# Patient Record
Sex: MALE | Race: BLACK OR AFRICAN AMERICAN | NOT HISPANIC OR LATINO | Employment: OTHER | URBAN - METROPOLITAN AREA
[De-identification: names, ages, dates, MRNs, and addresses within clinical notes are randomized per-mention and may not be internally consistent; named-entity substitution may affect disease eponyms.]

---

## 2017-04-17 ENCOUNTER — HISTORICAL (OUTPATIENT)
Dept: ADMINISTRATIVE | Facility: HOSPITAL | Age: 67
End: 2017-04-17

## 2020-12-03 DIAGNOSIS — E11.40 DIABETIC NEUROPATHY: Primary | ICD-10-CM

## 2020-12-08 ENCOUNTER — CLINICAL SUPPORT (OUTPATIENT)
Dept: REHABILITATION | Facility: HOSPITAL | Age: 70
End: 2020-12-08
Payer: OTHER GOVERNMENT

## 2020-12-08 DIAGNOSIS — E11.42 DIABETIC POLYNEUROPATHY ASSOCIATED WITH TYPE 2 DIABETES MELLITUS: ICD-10-CM

## 2020-12-08 DIAGNOSIS — E11.40 DIABETIC NEUROPATHY: ICD-10-CM

## 2020-12-08 PROCEDURE — 97162 PT EVAL MOD COMPLEX 30 MIN: CPT

## 2020-12-08 NOTE — PLAN OF CARE
"Name: Navi Burrows   Clinic Number: [id number]     Navi is a 70 y.o. male evaluated on 12/08/2020    Diagnosis:    Encounter Diagnosis   Name Primary?    Diabetic neuropathy       Physician:  Tiago Vazquez*     No past medical history on file.     No current outpatient medications on file.     No current facility-administered medications for this visit.         Review of patient's allergies indicates:  Not on File    Precautions :Fall  Occupation:   Retired    SocHx:   Patient lives with their family  in a single family home Navi has  has ramps to enter home.     Pt has DME  as follows:SW, 4-wheeled rollator, transfer tub bench, wheelchair and bedside commode  Previous Level of Function:  Ambulating with rollator in home. Reported gradual decrease in function, LE strength.    SUBJECTIVE    Navi Burrows's symptoms are gradual in onset.      Since onset:  worsening     Description of symptoms : decreased endurance, generalized weakness.    Falls: Yes. Last fall over 1 year ago.    Patient Goals:  To be able to ambulate easier and with better endurance. To be able to get in the shower without assistance.    OBJECTIVE:    Mental status :alert    Posture Alignment :forward head    Sensation: Light Touch: Impaired: (B) LE's with hypersensitivity (B) feet                   RANGE OF MOTION--LOWER EXTREMITIES   (R) LE: WFLs PROM   (L) LE: WFLs PROM        Lower Extremity Strength  Right LE  Left LE    Knee extension: 3-/5 Knee extension: 3+/5   Knee flexion: 3+/5 Knee flexion: 3+/5   Hip flexion: 3-/5 Hip flexion: 3-/5   Ankle dorsiflexion:   3-/5 Ankle dorsiflexion:   3/5   Ankle plantarflexion: 2-/5 Ankle plantarflexion: 3-/5     GAIT: Navi ambulates 50 feet with rollator with Mod I. Reported marked fatigue walking from parking lot to PT Dept, ~ 175 feet, and that he "wasn't sure if I would make it."    GAIT DEVIATIONS: Navi displays occasional unsteady gait with decreased base of support, " difficulty with advancement of (B)LE's, greatest limitation on the (R). Difficulty clearing toes with minimal swing phase. Bilateral knee flexion during stance.    LOWER EXTREMITY FUNCTIONAL SCALE         1. Any of your usual work, housework or school activities   0/4  2. Your usual hobbies, sporting     0/4  3. Getting in and out of tub      0/4  4. Walking between rooms      2/4  5. Putting on shoes or socks      2/4  6. Squatting        0/4  7. Lifting an object from the ground      1/4  8. Performing light activities around the home   1/4  9. Performing heavy activities around the home   0/4  10. Getting in and out of car      2/4  11. Walking 2 blocks       1/4  12.Walking a mile       0/4  13. Getting up and down 1 flight of stairs    0/4  14. Standing for 1 hour      0/4  15. Sitting for an hour       1/4  16. Running on even ground      0/4  17. Running on uneven ground     0/4  18. Making sharp turns when running fast    0/4  19. Hopping        0/4  20. Rolling over in bed       0/4  Score 10/80    Did not perform GALLAGHER Assessment at this time.       Time in: 1415  Time Out: 1455    PT Evaluation Completed?: Yes  Discussed plan of care with patient?: Yes    Written Home Exercises Provided: not today  Pt demonstrated good understanding of the education provided.     Assessment     This is a 70 y.o. male with a medical diagnosis of   Encounter Diagnosis   Name Primary?    Diabetic neuropathy    . Patient presents with abnormality of gait, decreased ambulatory/functional endurance, weakness (B)LE's, decreased functional mobility. Navi will benefit from skilled physical therapy intervention to address the above impairments and functional limitations.     The following goals were discussed with the patient and patient is in agreement with them as to be addressed in the treatment plan.     STG'S: 3 weeks  1. Pt will be able to ambulate 100 feet with rollator with minimal fatigue.  2. Pt will demonstrate  "ability to step up a 2" step with rollator and SBA  3. Pt will be independent with a home exercise program.      LTG'S : 6 weeks  1. Pt will report ability to get into the shower with only moderate assistance  2. Pt will report able to ambulate 150+ feet with rollator with only minimal fatigue  3. Pt will demonstrate ability to go sit to stand from a chair easily.  4. Pt will report able to walk between the rooms in his home with only a little difficulty.    PLAN:    Navi will be seen 2 times per weeks for the next 7 weeks fortherapeutice exercises, therapeutic activities, gait training, home exercise progression , patient education, balance and coordination exercises, step management and transfer training. Pt may be seen by a PTA as part of the Rehab Team. Certification 12/8/20 thru 1/27/21.    I certify the need for these services furnished under this plan of treatment and while under my care.       ____________________________________  Physician/Referring Practitioner    _______________  Date of Signature  "

## 2020-12-15 ENCOUNTER — CLINICAL SUPPORT (OUTPATIENT)
Dept: REHABILITATION | Facility: HOSPITAL | Age: 70
End: 2020-12-15
Payer: OTHER GOVERNMENT

## 2020-12-15 DIAGNOSIS — M62.81 GENERALIZED MUSCLE WEAKNESS: Primary | ICD-10-CM

## 2020-12-15 PROCEDURE — 97110 THERAPEUTIC EXERCISES: CPT

## 2020-12-15 NOTE — PROGRESS NOTES
"                                                    Physical Therapy Daily Note     Name: Navi Burrows  Clinic Number: [id number]  Diagnosis: No diagnosis found. Generalized weakness, deconditioning  Physician: Tiago Vazquez*  Precautions: Fall  Visit #: 2 of 15  Time In: 1055  Time Out: 1150    Subjective     Pt reports: Doing OK. Stated to "foot doctor" yesterday with some (B) foot soreness.  Pain Scale: Navi stated some (B) LE discomfort but would not rate pain level.    Objective     Navi received individual therapeutic exercises to develop strength, endurance and core stabilization for 55 minutes including:  Lower trunk rotation 2 x 10, quad sets 3 x 10 x 6 seconds, supine TA 10 x 6 seconds, LAQ 3x10. Seated hip flexion x10 with TA activation, seated heel and toe raises 3x10. Omnicycle x 4 minutes, neuro, 2 adrian    Written Home Exercises Provided: next visit  Pt demo good understanding of the education provided.    Education provided re: performing home exercises, activities.  Navi verbalized good understanding of education provided.   No spiritual or educational barriers to learning provided    Assessment     Patient tolerated exercises with minimal dificulty. Some fatigue noted with activities but pt appears to want to push through and difficulties with mobility, strength. Highly motivated.  This is a 70 y.o. male referred to outpatient physical therapy and presents with a medical diagnosis of generalized weakness and demonstrates limitations as described in the problem list. Pt prognosis is Good. Pt will continue to benefit from skilled outpatient physical therapy to address the deficits listed in the problem list, provide pt/family education and to maximize pt's level of independence in the home and community environment.     Goals as follows:  STG'S: 3 weeks  1. Pt will be able to ambulate 100 feet with rollator with minimal fatigue.  2. Pt will demonstrate ability to step up a 2" step " with rollator and SBA  3. Pt will be independent with a home exercise program.        LTG'S  : 6 weeks  1. Pt will report ability to get into the shower with only moderate assistance  2. Pt will report able to ambulate 150+ feet with rollator with only minimal fatigue  3. Pt will demonstrate ability to go sit to stand from a chair easily.  4. Pt will report able to walk between the rooms in his home with only a little difficulty.      Plan     Continue with established Plan of Care towards PT goals.    Therapist: Garrison Tejada, PT  12/15/2020

## 2020-12-16 ENCOUNTER — TELEPHONE (OUTPATIENT)
Dept: REHABILITATION | Facility: HOSPITAL | Age: 70
End: 2020-12-16

## 2020-12-17 ENCOUNTER — CLINICAL SUPPORT (OUTPATIENT)
Dept: REHABILITATION | Facility: HOSPITAL | Age: 70
End: 2020-12-17
Payer: OTHER GOVERNMENT

## 2020-12-17 DIAGNOSIS — M62.81 GENERALIZED MUSCLE WEAKNESS: Primary | ICD-10-CM

## 2020-12-17 PROCEDURE — 97110 THERAPEUTIC EXERCISES: CPT

## 2020-12-17 NOTE — PROGRESS NOTES
"                                                    Physical Therapy Daily Note     Name: Navi Burrows  Clinic Number: [id number]  Diagnosis: No diagnosis found. Generalized weakness, deconditioning  Physician: Tiago Vazquez*  Precautions: Fall  Visit #: 3 of 15  Time In: 1101  Time Out: 1156    Subjective     Pt reports: Doing OK. "I always have pain. Whatever we got to do, let's do it."  Pain Scale: Navi stated 6/10 pain (B) feet, legs, "usual".    Objective     Navi received individual therapeutic exercises to develop strength, endurance and core stabilization for 55 minutes including:  Lower trunk rotation 2 x 10, quad sets 3 x 10 x 6 seconds, supine TA 2x10 x 6 seconds, TKE 3x10 1.5#, bridging x 10, LAQ 2x10. Seated heel and toe raises 3x10. Omnicycle x 6 minutes, neuro, 2 adrian. Ambulated with rollator, SBA to parking lot ~ 175 feet with very slow pattern. C/o fatigue (B) LE's and shoulders.    Written Home Exercises Provided: next visit  Pt demo good understanding of the education provided.    Education provided re: performing home exercises, activities.  Navi verbalized good understanding of education provided.   No spiritual or educational barriers to learning provided    Assessment     Patient tolerated exercises with minimal dificulty. Able to perform TKE with weights. Fatigue noted (B) quadriceps noted today. Did not perform seated hip flexion. Increased activity on Omnicycle. Highly motivated.  This is a 70 y.o. male referred to outpatient physical therapy and presents with a medical diagnosis of generalized weakness and demonstrates limitations as described in the problem list. Pt prognosis is Good. Pt will continue to benefit from skilled outpatient physical therapy to address the deficits listed in the problem list, provide pt/family education and to maximize pt's level of independence in the home and community environment.     Goals as follows:  STG'S: 3 weeks  1. Pt will be able " "to ambulate 100 feet with rollator with minimal fatigue.  2. Pt will demonstrate ability to step up a 2" step with rollator and SBA  3. Pt will be independent with a home exercise program.        LTG'S  : 6 weeks  1. Pt will report ability to get into the shower with only moderate assistance  2. Pt will report able to ambulate 150+ feet with rollator with only minimal fatigue  3. Pt will demonstrate ability to go sit to stand from a chair easily.  4. Pt will report able to walk between the rooms in his home with only a little difficulty.      Plan     Continue with established Plan of Care towards PT goals.    Therapist: Garrison Tejada, PT  12/17/2020      "

## 2020-12-21 ENCOUNTER — TELEPHONE (OUTPATIENT)
Dept: REHABILITATION | Facility: HOSPITAL | Age: 70
End: 2020-12-21

## 2020-12-22 ENCOUNTER — CLINICAL SUPPORT (OUTPATIENT)
Dept: REHABILITATION | Facility: HOSPITAL | Age: 70
End: 2020-12-22
Payer: OTHER GOVERNMENT

## 2020-12-22 DIAGNOSIS — M62.81 GENERALIZED MUSCLE WEAKNESS: Primary | ICD-10-CM

## 2020-12-22 PROCEDURE — 97110 THERAPEUTIC EXERCISES: CPT | Mod: CQ

## 2020-12-22 NOTE — PROGRESS NOTES
"                                                    Physical Therapy Daily Note     Name: Navi Burrows  Clinic Number: [id number]  Diagnosis: Generalized weakness, deconditioning  Physician: Tiago Vazquez*  Precautions: Fall  Visit #: 4 of 15  Time In: 1048  Time Out: 1150    Subjective     Pt reports: "I live in pain." Pt states "I was weak this morning."  Pain Scale: Navi stated 7/10 pain (B) feet, legs, "usual".    Objective     Navi received individual therapeutic exercises to develop strength, endurance and core stabilization for 55 minutes including:  Lower trunk rotation 2 x 10, quad sets 3 x 10 x 6 seconds, supine TA 2x10 x 6 seconds, TKE 3x10 1.5#, bridging x 10, LAQ 2x10. Seated heel and toe raises 3x10. Seated alt hip flexion 3x10. Omnicycle x 2 minutes, neuro, 2 adrian. Omnicyle pedal not secured. Attempted to repair, but unsuccessful. Therefore stopped activity at this time. Offered to assist pt to car with W/C to follow d/t weakness, however, pt declined. Family assisted pt back to parking lot w/ use of rollator.     Written Home Exercises Provided: next visit.  Pt demo good understanding of the education provided.    Education provided re: performing home exercises, activities.  Navi verbalized good understanding of education provided.   No spiritual or educational barriers to learning provided    Assessment     Pt required assistance for mat mobility sup<>sit. Along w/ assistance to maintain heel contact to mat while in supine hooklying position. Post session, pt reported "You need to get tougher." Reminded pt that ex's were progressed during today's session despite subjective comments of increased weakness and fatigue at the start of therapy. Will cont to adjust and monitor appropriately  w/ each session.     This is a 70 y.o. male referred to outpatient physical therapy and presents with a medical diagnosis of generalized weakness and demonstrates limitations as described in the " "problem list. Pt will continue to benefit from skilled outpatient physical therapy to address the deficits listed in the problem list, provide pt/family education and maximize pt's level of independence in the home and community environment.     Goals as follows:  STG'S: 3 weeks  1. Pt will be able to ambulate 100 feet with rollator with minimal fatigue.  2. Pt will demonstrate ability to step up a 2" step with rollator and SBA  3. Pt will be independent with a home exercise program.        LTG'S  : 6 weeks  1. Pt will report ability to get into the shower with only moderate assistance  2. Pt will report able to ambulate 150+ feet with rollator with only minimal fatigue  3. Pt will demonstrate ability to go sit to stand from a chair easily.  4. Pt will report able to walk between the rooms in his home with only a little difficulty.      Plan     Continue with established Plan of Care towards PT goals.    Therapist: Chacha Bustos, PTA  12/22/2020      "

## 2020-12-28 ENCOUNTER — TELEPHONE (OUTPATIENT)
Dept: REHABILITATION | Facility: HOSPITAL | Age: 70
End: 2020-12-28

## 2020-12-29 ENCOUNTER — CLINICAL SUPPORT (OUTPATIENT)
Dept: REHABILITATION | Facility: HOSPITAL | Age: 70
End: 2020-12-29
Payer: OTHER GOVERNMENT

## 2020-12-29 DIAGNOSIS — M62.81 GENERALIZED MUSCLE WEAKNESS: Primary | ICD-10-CM

## 2020-12-29 PROCEDURE — 97110 THERAPEUTIC EXERCISES: CPT | Mod: CQ

## 2020-12-29 NOTE — PROGRESS NOTES
"                                                    Physical Therapy Daily Note     Name: Navi Burrows  Clinic Number: [id number]  Diagnosis: Generalized weakness, deconditioning  Physician: Tiago Vazquez*  Precautions: Fall  Visit #: 4 of 15  Time In: 1100  Time Out: 1200    Subjective     Pt reports: knee pain  Pain Scale: Navi stated 6/10 pain (L) > (R) knee, legs, "usual".    Objective     Navi received individual therapeutic exercises to develop strength, endurance and core stabilization for minutes including:  Lower trunk rotation 2 x 10, quad sets 3 x 10 x 6 seconds, supine TA 2x10 x 6 seconds, bridging 2x10, LAQ 2x10 2#. Seated heel and toe raises 3x10. Seated alt hip flexion 3x10 & seated ball squeeze 20x5". Omnicycle x 15 minutes, neuro, 2 adrian.     Written Home Exercises Provided: developed and distributed HEP handout.   Pt demo good understanding of the education provided.    Education provided re: performing home exercises, activities.  Navi verbalized good understanding of education provided.   No spiritual or educational barriers to learning provided    Assessment     Slight ex progression w/ good tolerance and no increase in pain reports.     This is a 70 y.o. male referred to outpatient physical therapy and presents with a medical diagnosis of generalized weakness and demonstrates limitations as described in the problem list. Pt will continue to benefit from skilled outpatient physical therapy to address the deficits listed in the problem list, provide pt/family education and maximize pt's level of independence in the home and community environment.     Goals as follows:  STG'S: 3 weeks  1. Pt will be able to ambulate 100 feet with rollator with minimal fatigue.  2. Pt will demonstrate ability to step up a 2" step with rollator and SBA  3. Pt will be independent with a home exercise program.        LTG'S  : 6 weeks  1. Pt will report ability to get into the shower with only " moderate assistance  2. Pt will report able to ambulate 150+ feet with rollator with only minimal fatigue  3. Pt will demonstrate ability to go sit to stand from a chair easily.  4. Pt will report able to walk between the rooms in his home with only a little difficulty.      Plan     Continue with established Plan of Care towards PT goals.    Therapist: Chacha Bustos, PTA  12/29/2020

## 2021-01-05 ENCOUNTER — CLINICAL SUPPORT (OUTPATIENT)
Dept: REHABILITATION | Facility: HOSPITAL | Age: 71
End: 2021-01-05
Payer: OTHER GOVERNMENT

## 2021-01-05 DIAGNOSIS — M62.81 GENERALIZED MUSCLE WEAKNESS: Primary | ICD-10-CM

## 2021-01-05 PROCEDURE — 97110 THERAPEUTIC EXERCISES: CPT

## 2021-01-06 ENCOUNTER — TELEPHONE (OUTPATIENT)
Dept: REHABILITATION | Facility: HOSPITAL | Age: 71
End: 2021-01-06

## 2021-01-07 ENCOUNTER — CLINICAL SUPPORT (OUTPATIENT)
Dept: REHABILITATION | Facility: HOSPITAL | Age: 71
End: 2021-01-07
Payer: OTHER GOVERNMENT

## 2021-01-07 DIAGNOSIS — M62.81 GENERALIZED MUSCLE WEAKNESS: Primary | ICD-10-CM

## 2021-01-07 PROCEDURE — 97110 THERAPEUTIC EXERCISES: CPT | Mod: CQ

## 2021-01-12 ENCOUNTER — CLINICAL SUPPORT (OUTPATIENT)
Dept: REHABILITATION | Facility: HOSPITAL | Age: 71
End: 2021-01-12
Payer: OTHER GOVERNMENT

## 2021-01-12 DIAGNOSIS — M62.81 GENERALIZED MUSCLE WEAKNESS: Primary | ICD-10-CM

## 2021-01-12 PROCEDURE — 97110 THERAPEUTIC EXERCISES: CPT | Mod: CQ

## 2021-01-14 ENCOUNTER — CLINICAL SUPPORT (OUTPATIENT)
Dept: REHABILITATION | Facility: HOSPITAL | Age: 71
End: 2021-01-14
Payer: OTHER GOVERNMENT

## 2021-01-14 ENCOUNTER — TELEPHONE (OUTPATIENT)
Dept: REHABILITATION | Facility: HOSPITAL | Age: 71
End: 2021-01-14

## 2021-01-14 DIAGNOSIS — M62.81 GENERALIZED MUSCLE WEAKNESS: Primary | ICD-10-CM

## 2021-01-14 PROCEDURE — 97110 THERAPEUTIC EXERCISES: CPT | Mod: CQ

## 2021-01-19 ENCOUNTER — CLINICAL SUPPORT (OUTPATIENT)
Dept: REHABILITATION | Facility: HOSPITAL | Age: 71
End: 2021-01-19
Payer: OTHER GOVERNMENT

## 2021-01-19 DIAGNOSIS — M62.81 GENERALIZED MUSCLE WEAKNESS: Primary | ICD-10-CM

## 2021-01-19 PROCEDURE — 97110 THERAPEUTIC EXERCISES: CPT | Mod: CQ

## 2021-01-20 ENCOUNTER — TELEPHONE (OUTPATIENT)
Dept: REHABILITATION | Facility: HOSPITAL | Age: 71
End: 2021-01-20

## 2021-01-21 ENCOUNTER — CLINICAL SUPPORT (OUTPATIENT)
Dept: REHABILITATION | Facility: HOSPITAL | Age: 71
End: 2021-01-21
Payer: OTHER GOVERNMENT

## 2021-01-21 DIAGNOSIS — M62.81 GENERALIZED MUSCLE WEAKNESS: Primary | ICD-10-CM

## 2021-01-21 PROCEDURE — 97110 THERAPEUTIC EXERCISES: CPT | Mod: CQ

## 2021-01-27 ENCOUNTER — DOCUMENTATION ONLY (OUTPATIENT)
Dept: REHABILITATION | Facility: HOSPITAL | Age: 71
End: 2021-01-27

## 2021-01-28 ENCOUNTER — CLINICAL SUPPORT (OUTPATIENT)
Dept: REHABILITATION | Facility: HOSPITAL | Age: 71
End: 2021-01-28
Payer: OTHER GOVERNMENT

## 2021-01-28 DIAGNOSIS — M62.81 GENERALIZED MUSCLE WEAKNESS: Primary | ICD-10-CM

## 2021-01-28 PROCEDURE — 97110 THERAPEUTIC EXERCISES: CPT

## 2021-02-02 ENCOUNTER — CLINICAL SUPPORT (OUTPATIENT)
Dept: REHABILITATION | Facility: HOSPITAL | Age: 71
End: 2021-02-02
Payer: OTHER GOVERNMENT

## 2021-02-02 DIAGNOSIS — M62.81 GENERALIZED MUSCLE WEAKNESS: Primary | ICD-10-CM

## 2021-02-02 PROCEDURE — 97110 THERAPEUTIC EXERCISES: CPT | Mod: CQ

## 2021-02-04 ENCOUNTER — HISTORICAL (OUTPATIENT)
Dept: CARDIOLOGY | Facility: HOSPITAL | Age: 71
End: 2021-02-04

## 2021-02-08 ENCOUNTER — TELEPHONE (OUTPATIENT)
Dept: REHABILITATION | Facility: HOSPITAL | Age: 71
End: 2021-02-08

## 2021-02-09 ENCOUNTER — CLINICAL SUPPORT (OUTPATIENT)
Dept: REHABILITATION | Facility: HOSPITAL | Age: 71
End: 2021-02-09
Payer: OTHER GOVERNMENT

## 2021-02-09 DIAGNOSIS — M62.81 GENERALIZED MUSCLE WEAKNESS: Primary | ICD-10-CM

## 2021-02-09 PROCEDURE — 97110 THERAPEUTIC EXERCISES: CPT

## 2021-02-11 ENCOUNTER — CLINICAL SUPPORT (OUTPATIENT)
Dept: REHABILITATION | Facility: HOSPITAL | Age: 71
End: 2021-02-11
Payer: OTHER GOVERNMENT

## 2021-02-11 DIAGNOSIS — M62.81 GENERALIZED MUSCLE WEAKNESS: Primary | ICD-10-CM

## 2021-02-11 PROCEDURE — 97110 THERAPEUTIC EXERCISES: CPT | Mod: CQ

## 2021-02-18 ENCOUNTER — CLINICAL SUPPORT (OUTPATIENT)
Dept: REHABILITATION | Facility: HOSPITAL | Age: 71
End: 2021-02-18
Payer: OTHER GOVERNMENT

## 2021-02-18 DIAGNOSIS — M62.81 GENERALIZED MUSCLE WEAKNESS: Primary | ICD-10-CM

## 2021-02-18 PROCEDURE — 97110 THERAPEUTIC EXERCISES: CPT | Mod: CQ

## 2021-02-23 ENCOUNTER — CLINICAL SUPPORT (OUTPATIENT)
Dept: REHABILITATION | Facility: HOSPITAL | Age: 71
End: 2021-02-23
Payer: OTHER GOVERNMENT

## 2021-02-23 DIAGNOSIS — M62.81 GENERALIZED MUSCLE WEAKNESS: Primary | ICD-10-CM

## 2021-02-23 PROCEDURE — 97110 THERAPEUTIC EXERCISES: CPT | Mod: CQ

## 2021-02-25 ENCOUNTER — CLINICAL SUPPORT (OUTPATIENT)
Dept: REHABILITATION | Facility: HOSPITAL | Age: 71
End: 2021-02-25
Payer: OTHER GOVERNMENT

## 2021-02-25 DIAGNOSIS — M62.81 GENERALIZED MUSCLE WEAKNESS: Primary | ICD-10-CM

## 2021-02-25 PROCEDURE — 97110 THERAPEUTIC EXERCISES: CPT

## 2021-03-02 ENCOUNTER — CLINICAL SUPPORT (OUTPATIENT)
Dept: REHABILITATION | Facility: HOSPITAL | Age: 71
End: 2021-03-02
Payer: OTHER GOVERNMENT

## 2021-03-02 DIAGNOSIS — M62.81 GENERALIZED MUSCLE WEAKNESS: Primary | ICD-10-CM

## 2021-03-02 PROCEDURE — 97110 THERAPEUTIC EXERCISES: CPT

## 2021-03-04 ENCOUNTER — CLINICAL SUPPORT (OUTPATIENT)
Dept: REHABILITATION | Facility: HOSPITAL | Age: 71
End: 2021-03-04
Payer: OTHER GOVERNMENT

## 2021-03-04 DIAGNOSIS — M62.81 GENERALIZED MUSCLE WEAKNESS: Primary | ICD-10-CM

## 2021-03-04 PROCEDURE — 97110 THERAPEUTIC EXERCISES: CPT | Mod: CQ

## 2021-03-09 ENCOUNTER — CLINICAL SUPPORT (OUTPATIENT)
Dept: REHABILITATION | Facility: HOSPITAL | Age: 71
End: 2021-03-09
Payer: OTHER GOVERNMENT

## 2021-03-09 DIAGNOSIS — M62.81 GENERALIZED MUSCLE WEAKNESS: Primary | ICD-10-CM

## 2021-03-09 PROCEDURE — 97110 THERAPEUTIC EXERCISES: CPT

## 2021-03-09 PROCEDURE — 97116 GAIT TRAINING THERAPY: CPT

## 2021-03-11 ENCOUNTER — CLINICAL SUPPORT (OUTPATIENT)
Dept: REHABILITATION | Facility: HOSPITAL | Age: 71
End: 2021-03-11
Payer: OTHER GOVERNMENT

## 2021-03-11 DIAGNOSIS — M62.81 GENERALIZED MUSCLE WEAKNESS: Primary | ICD-10-CM

## 2021-03-11 PROCEDURE — 97110 THERAPEUTIC EXERCISES: CPT

## 2021-03-16 ENCOUNTER — CLINICAL SUPPORT (OUTPATIENT)
Dept: REHABILITATION | Facility: HOSPITAL | Age: 71
End: 2021-03-16
Payer: OTHER GOVERNMENT

## 2021-03-16 DIAGNOSIS — M62.81 GENERALIZED MUSCLE WEAKNESS: Primary | ICD-10-CM

## 2021-03-16 PROCEDURE — 97110 THERAPEUTIC EXERCISES: CPT | Mod: CQ

## 2021-03-23 ENCOUNTER — CLINICAL SUPPORT (OUTPATIENT)
Dept: REHABILITATION | Facility: HOSPITAL | Age: 71
End: 2021-03-23
Payer: OTHER GOVERNMENT

## 2021-03-23 DIAGNOSIS — M62.81 GENERALIZED MUSCLE WEAKNESS: Primary | ICD-10-CM

## 2021-03-23 PROCEDURE — 97110 THERAPEUTIC EXERCISES: CPT | Mod: CQ

## 2021-03-25 ENCOUNTER — CLINICAL SUPPORT (OUTPATIENT)
Dept: REHABILITATION | Facility: HOSPITAL | Age: 71
End: 2021-03-25
Payer: OTHER GOVERNMENT

## 2021-03-25 DIAGNOSIS — M62.81 GENERALIZED MUSCLE WEAKNESS: Primary | ICD-10-CM

## 2021-03-25 PROCEDURE — 97110 THERAPEUTIC EXERCISES: CPT | Mod: CQ

## 2021-03-30 ENCOUNTER — CLINICAL SUPPORT (OUTPATIENT)
Dept: REHABILITATION | Facility: HOSPITAL | Age: 71
End: 2021-03-30
Payer: OTHER GOVERNMENT

## 2021-03-30 DIAGNOSIS — M62.81 GENERALIZED MUSCLE WEAKNESS: Primary | ICD-10-CM

## 2021-03-30 PROCEDURE — 97110 THERAPEUTIC EXERCISES: CPT

## 2021-03-30 PROCEDURE — 97116 GAIT TRAINING THERAPY: CPT

## 2021-04-01 ENCOUNTER — CLINICAL SUPPORT (OUTPATIENT)
Dept: REHABILITATION | Facility: HOSPITAL | Age: 71
End: 2021-04-01
Payer: OTHER GOVERNMENT

## 2021-04-01 DIAGNOSIS — M62.81 GENERALIZED MUSCLE WEAKNESS: Primary | ICD-10-CM

## 2021-04-01 PROCEDURE — 97110 THERAPEUTIC EXERCISES: CPT | Mod: CQ

## 2021-04-06 ENCOUNTER — CLINICAL SUPPORT (OUTPATIENT)
Dept: REHABILITATION | Facility: HOSPITAL | Age: 71
End: 2021-04-06
Payer: OTHER GOVERNMENT

## 2021-04-06 DIAGNOSIS — M62.81 GENERALIZED MUSCLE WEAKNESS: Primary | ICD-10-CM

## 2021-04-06 PROCEDURE — 97110 THERAPEUTIC EXERCISES: CPT | Mod: CQ

## 2021-04-08 ENCOUNTER — CLINICAL SUPPORT (OUTPATIENT)
Dept: REHABILITATION | Facility: HOSPITAL | Age: 71
End: 2021-04-08
Payer: OTHER GOVERNMENT

## 2021-04-08 DIAGNOSIS — M62.81 GENERALIZED MUSCLE WEAKNESS: Primary | ICD-10-CM

## 2021-04-08 PROCEDURE — 97110 THERAPEUTIC EXERCISES: CPT
